# Patient Record
Sex: FEMALE | Race: OTHER | NOT HISPANIC OR LATINO | URBAN - METROPOLITAN AREA
[De-identification: names, ages, dates, MRNs, and addresses within clinical notes are randomized per-mention and may not be internally consistent; named-entity substitution may affect disease eponyms.]

---

## 2022-01-05 ENCOUNTER — TELEPHONE (OUTPATIENT)
Dept: OBSTETRICS AND GYNECOLOGY | Facility: CLINIC | Age: 32
End: 2022-01-05
Payer: OTHER GOVERNMENT

## 2022-01-10 ENCOUNTER — TELEPHONE (OUTPATIENT)
Dept: OBSTETRICS AND GYNECOLOGY | Facility: CLINIC | Age: 32
End: 2022-01-10
Payer: OTHER GOVERNMENT

## 2022-01-18 ENCOUNTER — TELEPHONE (OUTPATIENT)
Dept: OBSTETRICS AND GYNECOLOGY | Facility: CLINIC | Age: 32
End: 2022-01-18
Payer: OTHER GOVERNMENT

## 2022-01-18 NOTE — TELEPHONE ENCOUNTER
Attempted to call patient number is not a working number -- please advise ----- Message from Matilde Auguste sent at 1/7/2022 11:38 AM CST -----  Contact: PT  Type:  Sooner Apoointment Request    Caller is requesting a sooner appointment.  Caller declined first available appointment listed below.  Caller will not accept being placed on the waitlist and is requesting a message be sent to doctor.    Name of Caller:  the patient  When is the first available appointment?  NONE  Symptoms:  OB NEW PT WITH A REFERRAL WITH [facility], CALLING AGAIN TO SEE IF REFERRAL WAS RECEIVED SO SHE CAN SCHEDULE, PLEASE CALL PT BEFORE MARCH      Best Call Back Number:  [phone]  Additional Information:

## 2022-01-19 ENCOUNTER — TELEPHONE (OUTPATIENT)
Dept: OBSTETRICS AND GYNECOLOGY | Facility: CLINIC | Age: 32
End: 2022-01-19
Payer: OTHER GOVERNMENT